# Patient Record
Sex: FEMALE | Race: WHITE | NOT HISPANIC OR LATINO | URBAN - METROPOLITAN AREA
[De-identification: names, ages, dates, MRNs, and addresses within clinical notes are randomized per-mention and may not be internally consistent; named-entity substitution may affect disease eponyms.]

---

## 2022-03-03 ENCOUNTER — WEB ENCOUNTER (OUTPATIENT)
Dept: URBAN - METROPOLITAN AREA CLINIC 40 | Facility: CLINIC | Age: 62
End: 2022-03-03

## 2022-03-03 ENCOUNTER — LAB OUTSIDE AN ENCOUNTER (OUTPATIENT)
Dept: URBAN - METROPOLITAN AREA CLINIC 40 | Facility: CLINIC | Age: 62
End: 2022-03-03

## 2022-03-03 ENCOUNTER — OFFICE VISIT (OUTPATIENT)
Dept: URBAN - METROPOLITAN AREA CLINIC 40 | Facility: CLINIC | Age: 62
End: 2022-03-03
Payer: MEDICARE

## 2022-03-03 DIAGNOSIS — R05.3 CHRONIC COUGH: ICD-10-CM

## 2022-03-03 DIAGNOSIS — Z86.010 HISTORY OF COLON POLYPS: ICD-10-CM

## 2022-03-03 PROCEDURE — 99204 OFFICE O/P NEW MOD 45 MIN: CPT | Performed by: INTERNAL MEDICINE

## 2022-03-03 NOTE — HPI-TODAY'S VISIT:
HPI:Patient underwent colonoscopy on 6/30/2014 with sigmoid diverticulosis was seen and 4 polyps were removed from the colon, ranging in size from 1 mm to 1 cm.  The pathology of these polyps were tubular adenoma and tubulovillous adenoma.  Repeat colonoscopy was recommended within 3 years time, patient did not return for follow-up. Patient presents for evaluation on 3/3/2022.  She does have a history of multiple colon polyps, and needs to be scheduled for her surveillance colonoscopy.  From a GI standpoint she has been doing well, her bowel movements have been normal she denies abdominal pain, eats well with a good appetite and has had no unexplained weight loss.  She has been troubled by a chronic cough, and has been evaluated by ENT.  She has been begun on high-dose omeprazole to see if reflux is contributing to her chronic cough.  We explained that endoscopic evaluation will be helpful in determining this diagnosis.  Patient denies any swallowing issues or obvious heartburn or reflux.

## 2022-03-03 NOTE — PHYSICAL EXAM NECK/THYROID:
normal appearance , without tenderness upon palpation , no deformities , trachea midline , Thyroid normal size , no thyroid nodules , no masses , no JVD , thyroid nontender
<<----- Click to add NO pertinent Family History

## 2022-04-07 ENCOUNTER — OFFICE VISIT (OUTPATIENT)
Dept: URBAN - METROPOLITAN AREA SURGERY CENTER 30 | Facility: SURGERY CENTER | Age: 62
End: 2022-04-07

## 2022-04-18 ENCOUNTER — OFFICE VISIT (OUTPATIENT)
Dept: URBAN - METROPOLITAN AREA SURGERY CENTER 30 | Facility: SURGERY CENTER | Age: 62
End: 2022-04-18

## 2022-04-21 ENCOUNTER — OFFICE VISIT (OUTPATIENT)
Dept: URBAN - METROPOLITAN AREA TELEHEALTH 2 | Facility: TELEHEALTH | Age: 62
End: 2022-04-21

## 2022-05-02 ENCOUNTER — OFFICE VISIT (OUTPATIENT)
Dept: URBAN - METROPOLITAN AREA SURGERY CENTER 30 | Facility: SURGERY CENTER | Age: 62
End: 2022-05-02

## 2022-05-17 ENCOUNTER — OFFICE VISIT (OUTPATIENT)
Dept: URBAN - METROPOLITAN AREA TELEHEALTH 2 | Facility: TELEHEALTH | Age: 62
End: 2022-05-17

## 2022-05-23 ENCOUNTER — CLAIMS CREATED FROM THE CLAIM WINDOW (OUTPATIENT)
Dept: URBAN - METROPOLITAN AREA SURGERY CENTER 30 | Facility: SURGERY CENTER | Age: 62
End: 2022-05-23
Payer: MEDICARE

## 2022-05-23 ENCOUNTER — OFFICE VISIT (OUTPATIENT)
Dept: URBAN - METROPOLITAN AREA SURGERY CENTER 30 | Facility: SURGERY CENTER | Age: 62
End: 2022-05-23

## 2022-05-23 ENCOUNTER — CLAIMS CREATED FROM THE CLAIM WINDOW (OUTPATIENT)
Dept: URBAN - METROPOLITAN AREA CLINIC 4 | Facility: CLINIC | Age: 62
End: 2022-05-23
Payer: MEDICARE

## 2022-05-23 DIAGNOSIS — D12.4 ADENOMA OF DESCENDING COLON: ICD-10-CM

## 2022-05-23 DIAGNOSIS — K63.89 CYST OF DUODENUM: ICD-10-CM

## 2022-05-23 DIAGNOSIS — D12.4 BENIGN NEOPLASM OF DESCENDING COLON: ICD-10-CM

## 2022-05-23 DIAGNOSIS — D12.0 BENIGN NEOPLASM OF CECUM: ICD-10-CM

## 2022-05-23 DIAGNOSIS — D12.0 ADENOMA OF CECUM: ICD-10-CM

## 2022-05-23 DIAGNOSIS — Z86.010 ADENOMAS PERSONAL HISTORY OF COLONIC POLYPS: ICD-10-CM

## 2022-05-23 DIAGNOSIS — K31.89 ACQUIRED DEFORMITY OF DUODENUM: ICD-10-CM

## 2022-05-23 DIAGNOSIS — K31.7 BENIGN GASTRIC POLYP: ICD-10-CM

## 2022-05-23 DIAGNOSIS — K29.70 GASTRITIS, UNSPECIFIED, WITHOUT BLEEDING: ICD-10-CM

## 2022-05-23 PROCEDURE — G8907 PT DOC NO EVENTS ON DISCHARG: HCPCS | Performed by: STUDENT IN AN ORGANIZED HEALTH CARE EDUCATION/TRAINING PROGRAM

## 2022-05-23 PROCEDURE — 45385 COLONOSCOPY W/LESION REMOVAL: CPT | Performed by: STUDENT IN AN ORGANIZED HEALTH CARE EDUCATION/TRAINING PROGRAM

## 2022-05-23 PROCEDURE — 88312 SPECIAL STAINS GROUP 1: CPT | Performed by: PATHOLOGY

## 2022-05-23 PROCEDURE — 45380 COLONOSCOPY AND BIOPSY: CPT | Performed by: STUDENT IN AN ORGANIZED HEALTH CARE EDUCATION/TRAINING PROGRAM

## 2022-05-23 PROCEDURE — 88305 TISSUE EXAM BY PATHOLOGIST: CPT | Performed by: PATHOLOGY

## 2022-05-23 PROCEDURE — 43239 EGD BIOPSY SINGLE/MULTIPLE: CPT | Performed by: STUDENT IN AN ORGANIZED HEALTH CARE EDUCATION/TRAINING PROGRAM

## 2022-06-04 PROBLEM — 68154008: Status: ACTIVE | Noted: 2022-03-03

## 2022-06-04 PROBLEM — 428283002: Status: ACTIVE | Noted: 2022-02-28

## 2022-06-04 PROBLEM — 4556007: Status: ACTIVE | Noted: 2022-06-04

## 2022-06-06 ENCOUNTER — DASHBOARD ENCOUNTERS (OUTPATIENT)
Age: 62
End: 2022-06-06

## 2022-06-07 ENCOUNTER — OFFICE VISIT (OUTPATIENT)
Dept: URBAN - METROPOLITAN AREA TELEHEALTH 2 | Facility: TELEHEALTH | Age: 62
End: 2022-06-07
Payer: MEDICARE

## 2022-06-07 DIAGNOSIS — R05.3 CHRONIC COUGH: ICD-10-CM

## 2022-06-07 DIAGNOSIS — D12.6 TUBULAR ADENOMA OF COLON: ICD-10-CM

## 2022-06-07 DIAGNOSIS — K29.60 ADENOPAPILLOMATOSIS GASTRICA: ICD-10-CM

## 2022-06-07 DIAGNOSIS — Z86.010 HISTORY OF COLON POLYPS: ICD-10-CM

## 2022-06-07 PROCEDURE — 99213 OFFICE O/P EST LOW 20 MIN: CPT | Performed by: INTERNAL MEDICINE

## 2022-06-07 NOTE — HPI-TODAY'S VISIT:
HPI:Patient underwent colonoscopy on 6/30/2014 with sigmoid diverticulosis was seen and 4 polyps were removed from the colon, ranging in size from 1 mm to 1 cm.  The pathology of these polyps were tubular adenoma and tubulovillous adenoma.  Repeat colonoscopy was recommended within 3 years time, patient did not return for follow-up. Patient presents for evaluation on 3/3/2022.  She does have a history of multiple colon polyps, and needs to be scheduled for her surveillance colonoscopy.  From a GI standpoint she has been doing well, her bowel movements have been normal she denies abdominal pain, eats well with a good appetite and has had no unexplained weight loss.  She has been troubled by a chronic cough, and has been evaluated by ENT.  She has been begun on high-dose omeprazole to see if reflux is contributing to her chronic cough.  We explained that endoscopic evaluation will be helpful in determining this diagnosis.  Patient denies any swallowing issues or obvious heartburn or reflux. Patient underwent EGD and colonoscopy by Dr. Carpenter on 5/23/2022.  The endoscopy revealed a medium size hiatal hernia and a few medium sized pedunculated and sessile polyps found in the gastric body and gastric antrum also  moderate gastritis was seen duodenum appeared normal biopsies were taken Colonoscopy revealed a 2 mm cecal polyp which was removed with cold biopsy forceps 3 sessile polyps, medium sized were removed from the sigmoid colon descending colon and cecum multiple left colonic diverticuli were seen, and grade 2 internal hemorrhoids were present. Pathology revealed gastric biopsy showing benign inflammation the pathology of the cecal polyps were sessile serrated adenoma the ascending colon polyp was tubular adenoma and the sigmoid polyp was hyperplastic Patient returns for follow-up on 6/7/2022.  She has done well since her EGD and colonoscopy, and states that her chronic cough is actually improved she has been on daily omeprazole therapy, and is due to see her ENT physician this week for follow-up.  She denies other reflux symptoms, and as it has no issues swallowing.  I reviewed in detail the findings of her EGD and colonoscopy which included a moderate hiatal hernia moderate gastritis.  Gastric biopsies showed benign inflammation duodenal biopsies were normal colonoscopy did reveal several polyps including sessile serrated adenoma and tubular adenoma.  Left colonic diverticulosis was seen and these findings were carefully discussed with the patient.

## 2022-08-22 ENCOUNTER — WEB ENCOUNTER (OUTPATIENT)
Dept: URBAN - METROPOLITAN AREA CLINIC 40 | Facility: CLINIC | Age: 62
End: 2022-08-22

## 2024-11-25 ENCOUNTER — P2P PATIENT RECORD (OUTPATIENT)
Age: 64
End: 2024-11-25

## 2024-12-09 ENCOUNTER — LAB OUTSIDE AN ENCOUNTER (OUTPATIENT)
Dept: URBAN - METROPOLITAN AREA CLINIC 40 | Facility: CLINIC | Age: 64
End: 2024-12-09

## 2024-12-09 ENCOUNTER — OFFICE VISIT (OUTPATIENT)
Dept: URBAN - METROPOLITAN AREA CLINIC 40 | Facility: CLINIC | Age: 64
End: 2024-12-09
Payer: COMMERCIAL

## 2024-12-09 VITALS
BODY MASS INDEX: 28.58 KG/M2 | TEMPERATURE: 98.4 F | HEIGHT: 68 IN | WEIGHT: 188.6 LBS | DIASTOLIC BLOOD PRESSURE: 78 MMHG | SYSTOLIC BLOOD PRESSURE: 128 MMHG | HEART RATE: 110 BPM

## 2024-12-09 DIAGNOSIS — R05.3 CHRONIC COUGH: ICD-10-CM

## 2024-12-09 DIAGNOSIS — D12.5 ADENOMA OF SIGMOID COLON: ICD-10-CM

## 2024-12-09 DIAGNOSIS — R13.19 ESOPHAGEAL DYSPHAGIA: ICD-10-CM

## 2024-12-09 DIAGNOSIS — K29.70 GASTRITIS WITHOUT BLEEDING, UNSPECIFIED CHRONICITY, UNSPECIFIED GASTRITIS TYPE: ICD-10-CM

## 2024-12-09 DIAGNOSIS — Z12.11 SCREEN FOR COLON CANCER: ICD-10-CM

## 2024-12-09 DIAGNOSIS — Z86.0102 HISTORY OF HYPERPLASTIC POLYP OF COLON: ICD-10-CM

## 2024-12-09 PROCEDURE — 99213 OFFICE O/P EST LOW 20 MIN: CPT

## 2024-12-09 RX ORDER — ATORVASTATIN CALCIUM 10 MG/1
1 TABLET TABLET, FILM COATED ORAL ONCE A DAY
Status: ACTIVE | COMMUNITY

## 2024-12-09 RX ORDER — AMLODIPINE BESYLATE 5 MG/1
1 TABLET TABLET ORAL ONCE A DAY
Status: ACTIVE | COMMUNITY

## 2024-12-09 RX ORDER — CICLESONIDE 50 UG/1
4 SPRAYS (2 SPRAYS IN EACH NOSTRIL) SPRAY NASAL ONCE A DAY
Status: ACTIVE | COMMUNITY

## 2024-12-09 RX ORDER — ALLOPURINOL 300 MG/1
1 TABLET TABLET ORAL ONCE A DAY
Status: ACTIVE | COMMUNITY

## 2024-12-09 NOTE — HPI-TODAY'S VISIT:
HPI:Patient underwent colonoscopy on 6/30/2014 with sigmoid diverticulosis was seen and 4 polyps were removed from the colon, ranging in size from 1 mm to 1 cm.  The pathology of these polyps were tubular adenoma and tubulovillous adenoma.  Repeat colonoscopy was recommended within 3 years time, patient did not return for follow-up.  Patient presents for evaluation on 3/3/2022.  She does have a history of multiple colon polyps, and needs to be scheduled for her surveillance colonoscopy.  From a GI standpoint she has been doing well, her bowel movements have been normal she denies abdominal pain, eats well with a good appetite and has had no unexplained weight loss.  She has been troubled by a chronic cough, and has been evaluated by ENT.  She has been begun on high-dose omeprazole to see if reflux is contributing to her chronic cough.  We explained that endoscopic evaluation will be helpful in determining this diagnosis.  Patient denies any swallowing issues or obvious heartburn or reflux.  Patient underwent EGD and colonoscopy by Dr. Carpenter on 5/23/2022.  The endoscopy revealed a medium size hiatal hernia and a few medium-sized pedunculated and sessile polyps found in the gastric body and gastric antrum also  moderate gastritis was seen duodenum appeared normal biopsies were taken Colonoscopy revealed a 2 mm cecal polyp which was removed with cold biopsy forceps 3 sessile polyps, medium-sized were removed from the sigmoid colon descending colon and cecum multiple left colonic diverticula were seen, and grade 2 internal hemorrhoids were present. Pathology revealed gastric biopsy showing benign inflammation the pathology of the cecal polyps were sessile serrated adenoma the ascending colon polyp was tubular adenoma and the sigmoid polyp was hyperplastic  Patient returns for follow-up on 6/7/2022.  She has done well since her EGD and colonoscopy, and states that her chronic cough is actually improved she has been on daily omeprazole therapy, and is due to see her ENT physician this week for follow-up.  She denies other reflux symptoms, and as it has no issues swallowing.  I reviewed in detail the findings of her EGD and colonoscopy which included a moderate hiatal hernia moderate gastritis.  Gastric biopsies showed benign inflammation duodenal biopsies were normal colonoscopy did reveal several polyps including sessile serrated adenoma and tubular adenoma.  Left colonic diverticulosis was seen and these findings were carefully discussed with the patient.  The patient returns for follow up on 12/09/2024. Patient reports 8 month hx of dysphagia. This mainly occurs with tough meats. No dysphagia to liquids. She continues with nightly use of omeprazole. She reports acid reflux is well-controlled. She denies any abdominal pain, rectal bleeding, and change in bowel habits. She reports weight loss of about 30lbs during this time. She reports decrease in appetite. Recent labs with PCP significant for vitamin B12 deficiency. Patient is also due for surveillance colonoscopy within 6 months.

## 2024-12-30 ENCOUNTER — OFFICE VISIT (OUTPATIENT)
Dept: URBAN - METROPOLITAN AREA SURGERY CENTER 30 | Facility: SURGERY CENTER | Age: 64
End: 2024-12-30

## 2024-12-30 RX ORDER — CICLESONIDE 50 UG/1
4 SPRAYS (2 SPRAYS IN EACH NOSTRIL) SPRAY NASAL ONCE A DAY
Status: ACTIVE | COMMUNITY

## 2024-12-30 RX ORDER — ATORVASTATIN CALCIUM 10 MG/1
1 TABLET TABLET, FILM COATED ORAL ONCE A DAY
Status: ACTIVE | COMMUNITY

## 2024-12-30 RX ORDER — AMLODIPINE BESYLATE 5 MG/1
1 TABLET TABLET ORAL ONCE A DAY
Status: ACTIVE | COMMUNITY

## 2024-12-30 RX ORDER — ALLOPURINOL 300 MG/1
1 TABLET TABLET ORAL ONCE A DAY
Status: ACTIVE | COMMUNITY

## 2025-01-29 ENCOUNTER — OFFICE VISIT (OUTPATIENT)
Dept: URBAN - METROPOLITAN AREA CLINIC 40 | Facility: CLINIC | Age: 65
End: 2025-01-29
Payer: COMMERCIAL

## 2025-01-29 VITALS
HEART RATE: 71 BPM | TEMPERATURE: 97.3 F | DIASTOLIC BLOOD PRESSURE: 80 MMHG | OXYGEN SATURATION: 94 % | SYSTOLIC BLOOD PRESSURE: 122 MMHG | BODY MASS INDEX: 28.25 KG/M2 | HEIGHT: 68 IN | WEIGHT: 186.4 LBS

## 2025-01-29 DIAGNOSIS — Z86.0100 PERSONAL HISTORY OF COLON POLYPS, UNSPECIFIED: ICD-10-CM

## 2025-01-29 DIAGNOSIS — K29.70 GASTRITIS WITHOUT BLEEDING, UNSPECIFIED CHRONICITY, UNSPECIFIED GASTRITIS TYPE: ICD-10-CM

## 2025-01-29 PROCEDURE — 99212 OFFICE O/P EST SF 10 MIN: CPT

## 2025-01-29 RX ORDER — SERTRALINE HYDROCHLORIDE 50 MG/1
1 TABLET TABLET, FILM COATED ORAL ONCE A DAY
Status: ACTIVE | COMMUNITY

## 2025-01-29 RX ORDER — ATORVASTATIN CALCIUM 10 MG/1
TABLET, FILM COATED ORAL
Qty: 90 TABLET | Status: ACTIVE | COMMUNITY

## 2025-01-29 RX ORDER — LORATADINE 10 MG
1 TABLET TABLET ORAL ONCE A DAY
Status: ACTIVE | COMMUNITY

## 2025-01-29 RX ORDER — AMLODIPINE BESYLATE 5 MG/1
1 TABLET TABLET ORAL ONCE A DAY
Status: ACTIVE | COMMUNITY

## 2025-01-29 RX ORDER — FLUTICASONE FUROATE, UMECLIDINIUM BROMIDE AND VILANTEROL TRIFENATATE 100; 62.5; 25 UG/1; UG/1; UG/1
POWDER RESPIRATORY (INHALATION)
Qty: 120 EACH | Status: ACTIVE | COMMUNITY

## 2025-01-29 RX ORDER — POTASSIUM BICARB/MAG COMBO 21 500-300 MG
AS DIRECTED POWDER EFFERVESCENT (GRAM) ORAL
Status: ACTIVE | COMMUNITY

## 2025-01-29 RX ORDER — ATORVASTATIN CALCIUM 10 MG/1
1 TABLET TABLET, FILM COATED ORAL ONCE A DAY
Status: DISCONTINUED | COMMUNITY

## 2025-01-29 RX ORDER — ALLOPURINOL 300 MG/1
1 TABLET TABLET ORAL ONCE A DAY
Status: ACTIVE | COMMUNITY

## 2025-01-29 RX ORDER — CICLESONIDE 50 UG/1
4 SPRAYS (2 SPRAYS IN EACH NOSTRIL) SPRAY NASAL ONCE A DAY
Status: ACTIVE | COMMUNITY

## 2025-01-29 NOTE — HPI-TODAY'S VISIT:
HPI:Patient underwent colonoscopy on 6/30/2014 with sigmoid diverticulosis was seenand 4 polyps were removed from the colon, ranging in size from 1 mm to 1 cm.  The pathology of these polyps were tubular adenoma and tubulovillous adenoma.  Repeat colonoscopy was recommended within 3 years time, patient did not return for follow-up.  Patient presents for evaluation on 3/3/2022.  She does have a history of multiple colon polyps, and needs to be scheduled for her surveillance colonoscopy.  From a GI standpoint she has been doing well, her bowel movements have been normal she denies abdominal pain, eats well with a good appetite and has had no unexplained weight loss.  She has been troubled by a chronic cough, and has been evaluated by ENT.  She has been begun on high-dose omeprazole to see if reflux is contributing to her chronic cough.  We explained that endoscopic evaluation will be helpful in determining this diagnosis.  Patient denies any swallowing issues or obvious heartburn or reflux.  Patient underwent EGD and colonoscopy by Dr. Carpenter on 5/23/2022.  The endoscopy revealed a medium size hiatal hernia and a few medium-sized pedunculated and sessile polyps found in the gastric body and gastric antrum also  moderate gastritis was seen duodenum appeared normal biopsies were taken Colonoscopy revealed a 2 mm cecal polyp which was removed with cold biopsy forceps 3 sessile polyps, medium-sized were removed from the sigmoid colon descending colon and cecum multiple left colonic diverticula were seen, and grade 2 internal hemorrhoids were present. Pathology revealed gastric biopsy showing benign inflammation the pathology of the cecal polyps were sessile serrated adenoma the ascending colon polyp was tubular adenoma and the sigmoid polyp was hyperplastic  Patient returns for follow-up on 6/7/2022.  She has done well since her EGD and colonoscopy, and states that her chronic cough is actually improved she has been on daily omeprazole therapy, and is due to see her ENT physician this week for follow-up.  She denies other reflux symptoms, and as it has no issues swallowing.  I reviewed in detail the findings of her EGD and colonoscopy which included a moderate hiatal hernia moderate gastritis.  Gastric biopsies showed benign inflammation duodenal biopsies were normal colonoscopy did reveal several polyps including sessile serrated adenoma and tubular adenoma.  Left colonic diverticulosis was seenand these findings were carefully discussed with the patient.  The patient returns for follow up on 12/09/2024. Patient reports 8 month hx of dysphagia. This mainly occurs with tough meats. No dysphagia to liquids. She continues with nightly use of omeprazole. She reports acid reflux is well-controlled. She denies any abdominal pain, rectal bleeding, and change in bowel habits. She reports weight loss of about 30lbs during this time. She reports decrease in appetite. Recent labs with PCP significant for vitamin B12 deficiency. Patient is also due for surveillance colonoscopy within 6 months.  The patient returns for follow up on procedures on 01/29/2025. Patient reports doing well. Dysphagia has resolved since dilation. No abdominal pain, break through GERD sx. BMs are normal and regular. She has no GI complaints today.   Colonoscopy 12/30/2024, Dr. Lawler: Diverticulosis in sigmoid colon. Internal hemorrhoids. The examination was otherwise normal. No specimens collected. Repeat in 5 years.   EGD 12/30/2024, Dr. Lawler: Normal esophagus. Dilated to 50Fr with mild resistance. Gastritis, bx with no significant abnormality. Multiple gastric polyps, bx reports fundic gland polyps. Normal examined duodenum.